# Patient Record
Sex: FEMALE | Race: BLACK OR AFRICAN AMERICAN | NOT HISPANIC OR LATINO | Employment: UNEMPLOYED | URBAN - METROPOLITAN AREA
[De-identification: names, ages, dates, MRNs, and addresses within clinical notes are randomized per-mention and may not be internally consistent; named-entity substitution may affect disease eponyms.]

---
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
## 2020-01-02 PROBLEM — Z3A.10 10 WEEKS GESTATION OF PREGNANCY: Status: ACTIVE | Noted: 2020-01-02

## 2020-01-02 PROBLEM — O09.91 SUPERVISION OF HIGH RISK PREGNANCY IN FIRST TRIMESTER: Status: ACTIVE | Noted: 2020-01-02

## 2020-01-07 PROBLEM — A74.9 CHLAMYDIA: Status: ACTIVE | Noted: 2020-01-07

## 2020-04-29 PROBLEM — O09.92 SUPERVISION OF HIGH RISK PREGNANCY IN SECOND TRIMESTER: Status: ACTIVE | Noted: 2020-04-29

## 2020-04-29 PROBLEM — Z3A.10 10 WEEKS GESTATION OF PREGNANCY: Status: RESOLVED | Noted: 2020-01-02 | Resolved: 2020-04-29

## 2020-04-29 PROBLEM — O09.91 SUPERVISION OF HIGH RISK PREGNANCY IN FIRST TRIMESTER: Status: RESOLVED | Noted: 2020-01-02 | Resolved: 2020-04-29

## 2020-04-29 PROBLEM — Z91.199 NON-COMPLIANCE: Status: ACTIVE | Noted: 2020-04-29

## 2020-05-06 PROBLEM — Z3A.28 28 WEEKS GESTATION OF PREGNANCY: Status: ACTIVE | Noted: 2020-05-06

## 2020-05-06 PROBLEM — O09.93 SUPERVISION OF HIGH RISK PREGNANCY IN THIRD TRIMESTER: Status: ACTIVE | Noted: 2020-04-29

## 2020-05-26 PROBLEM — Z3A.28 28 WEEKS GESTATION OF PREGNANCY: Status: RESOLVED | Noted: 2020-05-06 | Resolved: 2020-05-26

## 2020-05-26 PROBLEM — Z3A.31 31 WEEKS GESTATION OF PREGNANCY: Status: ACTIVE | Noted: 2020-05-26

## 2021-05-03 PROBLEM — Z3A.31 31 WEEKS GESTATION OF PREGNANCY: Status: RESOLVED | Noted: 2020-05-26 | Resolved: 2021-05-03

## 2021-05-12 ENCOUNTER — PATIENT MESSAGE (OUTPATIENT)
Dept: RESEARCH | Facility: HOSPITAL | Age: 21
End: 2021-05-12

## 2022-05-23 PROBLEM — O26.899 ABDOMINAL PAIN AFFECTING PREGNANCY: Status: ACTIVE | Noted: 2022-05-23

## 2022-05-23 PROBLEM — Z20.2 TRICHOMONAS CONTACT: Status: ACTIVE | Noted: 2022-05-23

## 2022-05-23 PROBLEM — R10.9 ABDOMINAL PAIN AFFECTING PREGNANCY: Status: ACTIVE | Noted: 2022-05-23

## 2022-06-28 PROBLEM — E05.90 SUBCLINICAL HYPERTHYROIDISM: Status: RESOLVED | Noted: 2019-10-16 | Resolved: 2022-06-28

## 2022-06-28 PROBLEM — O09.93 SUPERVISION OF HIGH RISK PREGNANCY IN THIRD TRIMESTER: Status: RESOLVED | Noted: 2020-04-29 | Resolved: 2022-06-28

## 2022-06-28 PROBLEM — O02.1 MISSED AB: Status: RESOLVED | Noted: 2019-04-16 | Resolved: 2022-06-28

## 2022-09-06 PROBLEM — O28.8 EQUIVOCAL NON-STRESS TEST: Status: ACTIVE | Noted: 2022-09-06

## 2022-09-09 PROBLEM — O23.593 TRICHOMONAL VAGINITIS DURING PREGNANCY IN THIRD TRIMESTER: Status: ACTIVE | Noted: 2022-05-23

## 2022-09-09 PROBLEM — A59.01 TRICHOMONAL VAGINITIS DURING PREGNANCY IN THIRD TRIMESTER: Status: ACTIVE | Noted: 2022-05-23

## 2022-09-25 PROBLEM — O99.019 SICKLE CELL TRAIT IN MOTHER AFFECTING PREGNANCY: Status: ACTIVE | Noted: 2019-06-01

## 2022-09-25 PROBLEM — Z3A.34 34 WEEKS GESTATION OF PREGNANCY: Status: ACTIVE | Noted: 2022-09-25

## 2022-09-25 PROBLEM — U07.1 COVID-19 AFFECTING PREGNANCY, ANTEPARTUM: Status: ACTIVE | Noted: 2022-09-25

## 2022-09-25 PROBLEM — O98.519 COVID-19 AFFECTING PREGNANCY, ANTEPARTUM: Status: ACTIVE | Noted: 2022-09-25

## 2023-06-20 ENCOUNTER — PATIENT MESSAGE (OUTPATIENT)
Dept: RESEARCH | Facility: HOSPITAL | Age: 23
End: 2023-06-20

## 2023-06-27 ENCOUNTER — PATIENT MESSAGE (OUTPATIENT)
Dept: RESEARCH | Facility: HOSPITAL | Age: 23
End: 2023-06-27

## 2023-09-04 PROBLEM — Z3A.34 34 WEEKS GESTATION OF PREGNANCY: Status: RESOLVED | Noted: 2022-09-25 | Resolved: 2023-09-04

## 2024-11-19 ENCOUNTER — PATIENT MESSAGE (OUTPATIENT)
Dept: RESEARCH | Facility: HOSPITAL | Age: 24
End: 2024-11-19

## 2025-05-07 ENCOUNTER — PATIENT MESSAGE (OUTPATIENT)
Dept: URGENT CARE | Facility: CLINIC | Age: 25
End: 2025-05-07
Payer: MEDICAID

## 2025-05-19 ENCOUNTER — ON-DEMAND VIRTUAL (OUTPATIENT)
Dept: URGENT CARE | Facility: CLINIC | Age: 25
End: 2025-05-19
Payer: MEDICAID

## 2025-05-19 DIAGNOSIS — A54.9 GONORRHEA: ICD-10-CM

## 2025-05-19 DIAGNOSIS — S69.90XA INJURY OF HAND, UNSPECIFIED LATERALITY, INITIAL ENCOUNTER: ICD-10-CM

## 2025-05-19 DIAGNOSIS — A64 SEXUALLY TRANSMITTED DISEASE: ICD-10-CM

## 2025-05-19 DIAGNOSIS — Z20.2 STD EXPOSURE: Primary | ICD-10-CM

## 2025-05-19 DIAGNOSIS — B37.31 YEAST VAGINITIS: ICD-10-CM

## 2025-05-19 PROCEDURE — 98005 SYNCH AUDIO-VIDEO EST LOW 20: CPT | Mod: 95,,, | Performed by: NURSE PRACTITIONER

## 2025-05-19 RX ORDER — NAPROXEN 500 MG/1
500 TABLET ORAL 2 TIMES DAILY WITH MEALS
Qty: 14 TABLET | Refills: 0 | Status: SHIPPED | OUTPATIENT
Start: 2025-05-19 | End: 2025-05-28

## 2025-05-19 RX ORDER — DOXYCYCLINE 100 MG/1
100 CAPSULE ORAL EVERY 12 HOURS
Qty: 14 CAPSULE | Refills: 0 | Status: SHIPPED | OUTPATIENT
Start: 2025-05-19 | End: 2025-05-28

## 2025-05-19 RX ORDER — CEFIXIME 400 MG/1
800 CAPSULE ORAL ONCE
Qty: 2 CAPSULE | Refills: 0 | Status: SHIPPED | OUTPATIENT
Start: 2025-05-19 | End: 2025-05-21

## 2025-05-19 RX ORDER — FLUCONAZOLE 150 MG/1
150 TABLET ORAL ONCE
Qty: 1 TABLET | Refills: 0 | Status: SHIPPED | OUTPATIENT
Start: 2025-05-19 | End: 2025-05-21

## 2025-05-19 NOTE — PROGRESS NOTES
Subjective:      Patient ID: David Ambrosio is a 25 y.o. female.    Vitals:  vitals were not taken for this visit.     Chief Complaint: Exposure to STD      Visit Type: TELE AUDIOVISUAL - This visit was conducted virtually based on  subjective information and limited objective exam    Present with the patient at the time of consultation: TELEMED PRESENT WITH PATIENT: None  LOCATION OF PATIENT [place], [place]   Two patient identifiers used to verify patient- saying out date of birth and full name.       Past Medical History:   Diagnosis Date    MDD (major depressive disorder), recurrent severe, without psychosis 12/20/2019     Past Surgical History:   Procedure Laterality Date    BREAST SURGERY Right 2017    DILATION AND CURETTAGE OF UTERUS  03/2019     Review of patient's allergies indicates:  No Known Allergies  Medications Ordered Prior to Encounter[1]  Family History   Problem Relation Name Age of Onset    Hypertension Maternal Grandmother      Hypertension Maternal Grandfather         Medications Ordered                [facility]   [address], [address] [place] [address], [facility] [id number]    Telephone: [phone]   Fax: [phone]   Hours: Mon-Fri, 730a-5p                         Internal Pharmacy (4 of 4)              cefixime (SUPRAX) 400 mg Cap    Sig: Take 2 capsules by mouth once for 1 dose       Start: 5/19/25     Quantity: 2 capsule Refills: 0                         doxycycline (VIBRAMYCIN) 100 MG Cap    Sig: Take 1 capsule by mouth every 12 (twelve) hours for 7 days       Start: 5/19/25     Quantity: 14 capsule Refills: 0                         fluconazole (DIFLUCAN) 150 MG Tab    Sig: Take 1 tablet by mouth once for 1 dose       Start: 5/19/25     Quantity: 1 tablet Refills: 0                         naproxen (NAPROSYN) 500 MG tablet    Sig: Take 1 tablet by mouth 2 (two) times daily with meals for 7 days       Start: 5/19/25     Quantity: 14 tablet Refills: 0                            Ohs Peq Odvv Intake    5/19/2025  1:33 PM CDT - Filed by Patient   What is your current physical address in the event of a medical emergency? [address] [id number]   Are you able to take your vital signs? No   Please attach any relevant images or files    Is your employer contracted with [facility]? No         24 yo female with 1st metacarpal fracture diagnosed 5/6/25 in ED complains of persistent pain to site. She reports taking Tramadol without relief. She is scheduled to follow up with orthopedics this week. Has tried Ibuprofen without relief. Patient also requesting treatment for GC/Chlamydia. Routine UA and STI panel sent on day of ED visit. GC/chlamdyia returned positive- patient states she has not been treated.         Genitourinary:  Negative for dysuria and frequency.        Vaginal irritation     Musculoskeletal:  Positive for pain.        Objective:   The physical exam was conducted virtually.    AAO x 3 ; no acute distress noted; appearance normal; mood and behavior normal; thought process normal  Head- normocephalic  Nose- appears normal, no discharge or erythema  Eyes- pupils appear normal in size, no drainage, no erythema  Ears- normal appearing; no discharge, no erythema  Mouth- appears normal  Oropharynx- no erythema, lesions  Lungs- breathing at a normal rate, no acute distress noted  Heart- no reports of tachycardia, palpitations, chest pain  Abdomen- non distended, non tender reported by patient  Skin- warm and dry, no erythema or edema noted by patient or visualized  Psych- as above; no si/hi      Assessment:     1. STD exposure    2. Yeast vaginitis    3. Injury of hand, unspecified laterality, initial encounter        Plan:   Complete doxycyline for treatment of chlamdyia and cefexime for treatment of gonorrhea. Complete diflucan after completing course of antibiotics for STD. Avoid intercourse for 2 weeks. Advise Naproxen for management of right hand pain. Recommend  close follow up with orthopedics.       Thank you for choosing [facility]!    Our goal in the [facility] is to always provide outstanding medical care. You may receive a survey by mail or e-mail in the next week regarding your experience today. We would greatly appreciate you completing and returning the survey. Your feedback provides us with a way to recognize our staff who provide very good care, and it helps us learn how to improve when your experience was below our aspiration of excellence.         We appreciate you trusting us with your medical care. We hope you feel better soon. We will be happy to take care of you for all of your future medical needs.    You must understand that you've received an Urgent Care treatment only and that you may be released before all your medical problems are known or treated. You, the patient, will arrange for follow up care as instructed.    Follow up with your PCP or specialty clinic as directed in the next 1-2 weeks if not improved or as needed.  You can call [phone] to schedule an appointment with the appropriate provider.    If your condition worsens we recommend that you receive another evaluation in person, with your primary care provider, urgent care or at the emergency room immediately or contact your primary medical clinics after hours call service to discuss your concerns.         STD exposure  -     cefixime (SUPRAX) 400 mg Cap; Take 2 capsules by mouth once for 1 dose  Dispense: 2 capsule; Refill: 0    Yeast vaginitis  -     fluconazole (DIFLUCAN) 150 MG Tab; Take 1 tablet by mouth once for 1 dose  Dispense: 1 tablet; Refill: 0    Injury of hand, unspecified laterality, initial encounter  -     naproxen (NAPROSYN) 500 MG tablet; Take 1 tablet by mouth 2 (two) times daily with meals for 7 days  Dispense: 14 tablet; Refill: 0    Other orders  -     doxycycline (VIBRAMYCIN) 100 MG Cap; Take 1 capsule by mouth every 12  (twelve) hours for 7 days  Dispense: 14 capsule; Refill: 0                          [1]   Current Outpatient Medications on File Prior to Visit   Medication Sig Dispense Refill    famotidine (PEPCID) 20 MG tablet Take 1 tablet (20 mg total) by mouth 2 (two) times daily as needed for Heartburn. (Patient not taking: Reported on 10/10/2023) 60 tablet 0    HYDROcodone-acetaminophen (NORCO)  mg per tablet Take 1 tablet by mouth every 6 (six) hours as needed. 12 tablet 0     No current facility-administered medications on file prior to visit.

## 2025-05-21 PROBLEM — S62.201A: Status: ACTIVE | Noted: 2025-05-21

## 2025-05-21 PROBLEM — M79.644 CHRONIC PAIN OF RIGHT THUMB: Status: ACTIVE | Noted: 2025-05-21

## 2025-05-21 PROBLEM — S69.91XA INJURY OF RIGHT THUMB: Status: ACTIVE | Noted: 2025-05-21

## 2025-05-21 PROBLEM — G89.29 CHRONIC PAIN OF RIGHT THUMB: Status: ACTIVE | Noted: 2025-05-21

## 2025-05-25 PROBLEM — S62.211D: Status: ACTIVE | Noted: 2025-05-25

## 2025-06-13 ENCOUNTER — PATIENT MESSAGE (OUTPATIENT)
Dept: URGENT CARE | Facility: CLINIC | Age: 25
End: 2025-06-13